# Patient Record
Sex: FEMALE | Race: OTHER | HISPANIC OR LATINO | URBAN - METROPOLITAN AREA
[De-identification: names, ages, dates, MRNs, and addresses within clinical notes are randomized per-mention and may not be internally consistent; named-entity substitution may affect disease eponyms.]

---

## 2017-11-21 ENCOUNTER — EMERGENCY (EMERGENCY)
Facility: HOSPITAL | Age: 27
LOS: 1 days | End: 2017-11-21

## 2017-11-21 VITALS
HEART RATE: 83 BPM | OXYGEN SATURATION: 99 % | SYSTOLIC BLOOD PRESSURE: 102 MMHG | RESPIRATION RATE: 16 BRPM | WEIGHT: 119.93 LBS | DIASTOLIC BLOOD PRESSURE: 68 MMHG | TEMPERATURE: 98 F

## 2017-11-21 DIAGNOSIS — Z90.49 ACQUIRED ABSENCE OF OTHER SPECIFIED PARTS OF DIGESTIVE TRACT: Chronic | ICD-10-CM

## 2022-08-05 ENCOUNTER — INPATIENT (INPATIENT)
Facility: HOSPITAL | Age: 32
LOS: 2 days | Discharge: HOME | DRG: 390 | End: 2022-08-08
Attending: SURGERY | Admitting: SURGERY
Payer: COMMERCIAL

## 2022-08-05 VITALS
OXYGEN SATURATION: 99 % | TEMPERATURE: 98 F | WEIGHT: 128.09 LBS | RESPIRATION RATE: 18 BRPM | SYSTOLIC BLOOD PRESSURE: 118 MMHG | DIASTOLIC BLOOD PRESSURE: 55 MMHG | HEIGHT: 62 IN | HEART RATE: 68 BPM

## 2022-08-05 DIAGNOSIS — Z90.49 ACQUIRED ABSENCE OF OTHER SPECIFIED PARTS OF DIGESTIVE TRACT: Chronic | ICD-10-CM

## 2022-08-05 DIAGNOSIS — K56.609 UNSPECIFIED INTESTINAL OBSTRUCTION, UNSPECIFIED AS TO PARTIAL VERSUS COMPLETE OBSTRUCTION: ICD-10-CM

## 2022-08-05 LAB
ALBUMIN SERPL ELPH-MCNC: 4.8 G/DL — SIGNIFICANT CHANGE UP (ref 3.3–5)
ALP SERPL-CCNC: 68 U/L — SIGNIFICANT CHANGE UP (ref 40–120)
ALT FLD-CCNC: 13 U/L — SIGNIFICANT CHANGE UP (ref 10–45)
ANION GAP SERPL CALC-SCNC: 14 MMOL/L — SIGNIFICANT CHANGE UP (ref 5–17)
APPEARANCE UR: CLEAR — SIGNIFICANT CHANGE UP
APTT BLD: 30 SEC — SIGNIFICANT CHANGE UP (ref 27.5–35.5)
AST SERPL-CCNC: 21 U/L — SIGNIFICANT CHANGE UP (ref 10–40)
BASOPHILS # BLD AUTO: 0.04 K/UL — SIGNIFICANT CHANGE UP (ref 0–0.2)
BASOPHILS NFR BLD AUTO: 0.4 % — SIGNIFICANT CHANGE UP (ref 0–2)
BILIRUB SERPL-MCNC: 0.6 MG/DL — SIGNIFICANT CHANGE UP (ref 0.2–1.2)
BILIRUB UR-MCNC: NEGATIVE — SIGNIFICANT CHANGE UP
BLD GP AB SCN SERPL QL: NEGATIVE — SIGNIFICANT CHANGE UP
BUN SERPL-MCNC: 12 MG/DL — SIGNIFICANT CHANGE UP (ref 7–23)
CALCIUM SERPL-MCNC: 9.9 MG/DL — SIGNIFICANT CHANGE UP (ref 8.4–10.5)
CHLORIDE SERPL-SCNC: 99 MMOL/L — SIGNIFICANT CHANGE UP (ref 96–108)
CO2 SERPL-SCNC: 24 MMOL/L — SIGNIFICANT CHANGE UP (ref 22–31)
COLOR SPEC: SIGNIFICANT CHANGE UP
CREAT SERPL-MCNC: 0.77 MG/DL — SIGNIFICANT CHANGE UP (ref 0.5–1.3)
DIFF PNL FLD: NEGATIVE — SIGNIFICANT CHANGE UP
EGFR: 104 ML/MIN/1.73M2 — SIGNIFICANT CHANGE UP
EOSINOPHIL # BLD AUTO: 0.15 K/UL — SIGNIFICANT CHANGE UP (ref 0–0.5)
EOSINOPHIL NFR BLD AUTO: 1.5 % — SIGNIFICANT CHANGE UP (ref 0–6)
GLUCOSE SERPL-MCNC: 90 MG/DL — SIGNIFICANT CHANGE UP (ref 70–99)
GLUCOSE UR QL: NEGATIVE — SIGNIFICANT CHANGE UP
HCG SERPL-ACNC: <2 MIU/ML — SIGNIFICANT CHANGE UP
HCT VFR BLD CALC: 41.5 % — SIGNIFICANT CHANGE UP (ref 34.5–45)
HGB BLD-MCNC: 13.7 G/DL — SIGNIFICANT CHANGE UP (ref 11.5–15.5)
HIV 1 & 2 AB SERPL IA.RAPID: SIGNIFICANT CHANGE UP
IMM GRANULOCYTES NFR BLD AUTO: 0.3 % — SIGNIFICANT CHANGE UP (ref 0–1.5)
INR BLD: 0.93 RATIO — SIGNIFICANT CHANGE UP (ref 0.88–1.16)
KETONES UR-MCNC: ABNORMAL
LEUKOCYTE ESTERASE UR-ACNC: NEGATIVE — SIGNIFICANT CHANGE UP
LIDOCAIN IGE QN: 20 U/L — SIGNIFICANT CHANGE UP (ref 7–60)
LYMPHOCYTES # BLD AUTO: 2.02 K/UL — SIGNIFICANT CHANGE UP (ref 1–3.3)
LYMPHOCYTES # BLD AUTO: 20.3 % — SIGNIFICANT CHANGE UP (ref 13–44)
MCHC RBC-ENTMCNC: 28.8 PG — SIGNIFICANT CHANGE UP (ref 27–34)
MCHC RBC-ENTMCNC: 33 GM/DL — SIGNIFICANT CHANGE UP (ref 32–36)
MCV RBC AUTO: 87.2 FL — SIGNIFICANT CHANGE UP (ref 80–100)
MONOCYTES # BLD AUTO: 0.62 K/UL — SIGNIFICANT CHANGE UP (ref 0–0.9)
MONOCYTES NFR BLD AUTO: 6.2 % — SIGNIFICANT CHANGE UP (ref 2–14)
NEUTROPHILS # BLD AUTO: 7.07 K/UL — SIGNIFICANT CHANGE UP (ref 1.8–7.4)
NEUTROPHILS NFR BLD AUTO: 71.3 % — SIGNIFICANT CHANGE UP (ref 43–77)
NITRITE UR-MCNC: NEGATIVE — SIGNIFICANT CHANGE UP
NRBC # BLD: 0 /100 WBCS — SIGNIFICANT CHANGE UP (ref 0–0)
PH UR: 8.5 — HIGH (ref 5–8)
PLATELET # BLD AUTO: 280 K/UL — SIGNIFICANT CHANGE UP (ref 150–400)
POTASSIUM SERPL-MCNC: 3.9 MMOL/L — SIGNIFICANT CHANGE UP (ref 3.5–5.3)
POTASSIUM SERPL-SCNC: 3.9 MMOL/L — SIGNIFICANT CHANGE UP (ref 3.5–5.3)
PROT SERPL-MCNC: 8 G/DL — SIGNIFICANT CHANGE UP (ref 6–8.3)
PROT UR-MCNC: NEGATIVE — SIGNIFICANT CHANGE UP
PROTHROM AB SERPL-ACNC: 10.8 SEC — SIGNIFICANT CHANGE UP (ref 10.5–13.4)
RBC # BLD: 4.76 M/UL — SIGNIFICANT CHANGE UP (ref 3.8–5.2)
RBC # FLD: 13.3 % — SIGNIFICANT CHANGE UP (ref 10.3–14.5)
RH IG SCN BLD-IMP: POSITIVE — SIGNIFICANT CHANGE UP
SARS-COV-2 RNA SPEC QL NAA+PROBE: SIGNIFICANT CHANGE UP
SODIUM SERPL-SCNC: 137 MMOL/L — SIGNIFICANT CHANGE UP (ref 135–145)
SP GR SPEC: 1.01 — SIGNIFICANT CHANGE UP (ref 1.01–1.02)
UROBILINOGEN FLD QL: NEGATIVE — SIGNIFICANT CHANGE UP
WBC # BLD: 9.93 K/UL — SIGNIFICANT CHANGE UP (ref 3.8–10.5)
WBC # FLD AUTO: 9.93 K/UL — SIGNIFICANT CHANGE UP (ref 3.8–10.5)

## 2022-08-05 PROCEDURE — 93975 VASCULAR STUDY: CPT | Mod: 26

## 2022-08-05 PROCEDURE — 74177 CT ABD & PELVIS W/CONTRAST: CPT | Mod: 26,MA

## 2022-08-05 PROCEDURE — 99285 EMERGENCY DEPT VISIT HI MDM: CPT

## 2022-08-05 PROCEDURE — 76830 TRANSVAGINAL US NON-OB: CPT | Mod: 26

## 2022-08-05 PROCEDURE — 71045 X-RAY EXAM CHEST 1 VIEW: CPT | Mod: 26

## 2022-08-05 RX ORDER — SODIUM CHLORIDE 9 MG/ML
1000 INJECTION, SOLUTION INTRAVENOUS
Refills: 0 | Status: DISCONTINUED | OUTPATIENT
Start: 2022-08-05 | End: 2022-08-07

## 2022-08-05 RX ORDER — MORPHINE SULFATE 50 MG/1
4 CAPSULE, EXTENDED RELEASE ORAL ONCE
Refills: 0 | Status: DISCONTINUED | OUTPATIENT
Start: 2022-08-05 | End: 2022-08-05

## 2022-08-05 RX ORDER — TETRACAINE/BENZOCAINE/BUTAMBEN 2%-14%-2%
1 OINTMENT (GRAM) TOPICAL EVERY 6 HOURS
Refills: 0 | Status: DISCONTINUED | OUTPATIENT
Start: 2022-08-05 | End: 2022-08-07

## 2022-08-05 RX ORDER — HEPARIN SODIUM 5000 [USP'U]/ML
5000 INJECTION INTRAVENOUS; SUBCUTANEOUS EVERY 8 HOURS
Refills: 0 | Status: DISCONTINUED | OUTPATIENT
Start: 2022-08-05 | End: 2022-08-08

## 2022-08-05 RX ORDER — ONDANSETRON 8 MG/1
4 TABLET, FILM COATED ORAL ONCE
Refills: 0 | Status: COMPLETED | OUTPATIENT
Start: 2022-08-05 | End: 2022-08-05

## 2022-08-05 RX ORDER — ACETAMINOPHEN 500 MG
1000 TABLET ORAL ONCE
Refills: 0 | Status: COMPLETED | OUTPATIENT
Start: 2022-08-05 | End: 2022-08-05

## 2022-08-05 RX ORDER — SODIUM CHLORIDE 9 MG/ML
1000 INJECTION INTRAMUSCULAR; INTRAVENOUS; SUBCUTANEOUS ONCE
Refills: 0 | Status: COMPLETED | OUTPATIENT
Start: 2022-08-05 | End: 2022-08-05

## 2022-08-05 RX ADMIN — Medication 1 SPRAY(S): at 13:19

## 2022-08-05 RX ADMIN — Medication 1 SPRAY(S): at 22:26

## 2022-08-05 RX ADMIN — Medication 400 MILLIGRAM(S): at 05:29

## 2022-08-05 RX ADMIN — MORPHINE SULFATE 4 MILLIGRAM(S): 50 CAPSULE, EXTENDED RELEASE ORAL at 08:04

## 2022-08-05 RX ADMIN — Medication 400 MILLIGRAM(S): at 11:31

## 2022-08-05 RX ADMIN — Medication 1000 MILLIGRAM(S): at 10:54

## 2022-08-05 RX ADMIN — HEPARIN SODIUM 5000 UNIT(S): 5000 INJECTION INTRAVENOUS; SUBCUTANEOUS at 22:21

## 2022-08-05 RX ADMIN — SODIUM CHLORIDE 50 MILLILITER(S): 9 INJECTION, SOLUTION INTRAVENOUS at 11:15

## 2022-08-05 RX ADMIN — ONDANSETRON 4 MILLIGRAM(S): 8 TABLET, FILM COATED ORAL at 05:29

## 2022-08-05 RX ADMIN — SODIUM CHLORIDE 120 MILLILITER(S): 9 INJECTION, SOLUTION INTRAVENOUS at 22:21

## 2022-08-05 RX ADMIN — HEPARIN SODIUM 5000 UNIT(S): 5000 INJECTION INTRAVENOUS; SUBCUTANEOUS at 13:16

## 2022-08-05 RX ADMIN — Medication 1000 MILLIGRAM(S): at 23:43

## 2022-08-05 RX ADMIN — MORPHINE SULFATE 4 MILLIGRAM(S): 50 CAPSULE, EXTENDED RELEASE ORAL at 09:14

## 2022-08-05 RX ADMIN — Medication 400 MILLIGRAM(S): at 23:13

## 2022-08-05 RX ADMIN — SODIUM CHLORIDE 1000 MILLILITER(S): 9 INJECTION INTRAMUSCULAR; INTRAVENOUS; SUBCUTANEOUS at 09:19

## 2022-08-05 RX ADMIN — Medication 1000 MILLIGRAM(S): at 06:54

## 2022-08-05 NOTE — ED ADULT NURSE NOTE - NSIMPLEMENTINTERV_GEN_ALL_ED
actual Implemented All Universal Safety Interventions:  [place] to call system. Call bell, personal items and telephone within reach. Instruct patient to call for assistance. Room bathroom lighting operational. Non-slip footwear when patient is off stretcher. Physically safe environment: no spills, clutter or unnecessary equipment. Stretcher in lowest position, wheels locked, appropriate side rails in place.

## 2022-08-05 NOTE — ED ADULT NURSE REASSESSMENT NOTE - NS ED NURSE REASSESS COMMENT FT1
Received a 31F aaox4 ambulatory with  PSH appendectomy presenting with abdominal pain. Pain began at 5pm yesterday following a bowel movement, feels sharp, initially located in epigastrium and migrated to RLQ. Associated with nausea, patient has been retching  but with no vomit. Denies diarrhea, dyspnea, fever or chills. RT ac 18g IV access noted, patient was sent to US. CT scan resulted with SBO noted. Pending surgical consult, kept NPO. VS WDL.

## 2022-08-05 NOTE — ED PROVIDER NOTE - ATTENDING CONTRIBUTION TO CARE
Patient is a 30 yo F with history of appendectomy here for abdominal pain that started this evening. She states she ate around 2 pm, had a bowel movement around 5:30 pm and pain started after that. She reports a burning epigastric pain but now has worse pain in her lower abdomen. Denies dysuria, diarrhea, vaginal discharge. She does have a history of ovarian cysts but is not sure where.     VS noted  Gen: uncomfortable  HEENT: EOMI, mmm  Lungs: CTAB/L no C/ W /R   CVS: RRR   Abd; Soft, ttp in lower abdomen, ttp in RLQ, no CVA tenderness bilaterally  Ext: no edema  Skin: no rash  Neuro AAOx3 non focal clear speech  a/p: abdominal pain - RLQ pain, pt is s/p appendectomy. Will do pelvic exam, get labs and TVUS. Plan for CT A/P if no findings. Differential includes colitis, ovarian torsion, ovarian cyst rupture. Patient has no pain in RUQ. Biliary pathology less likely based on exam however epigastric discomfort is concerning. Will check labs and order RUQ US if no findings.   - Keiko BOND

## 2022-08-05 NOTE — ED PROVIDER NOTE - CLINICAL SUMMARY MEDICAL DECISION MAKING FREE TEXT BOX
Patient is a 32 yo F with PSH appendectomy presenting with RLQ abdominal pain, R adnexal tenderness on exam. Given patient has had appendectomy, most concerned for R ovarian torsion vs. ruptured cyst vs. abscess vs. hemorrhagic cyst. Less likely PID with no CMT on exam. Unlikely appendicitis with history of appendectomy. If ruled out other etiologies may be attributable to constipation, IBD, though these diagnoses less likely to fit pattern of exam and pain findings. To evaluate with TVUS and/or CT, labs, then reevaluate.

## 2022-08-05 NOTE — ED PROVIDER NOTE - OBJECTIVE STATEMENT
Patient is a 30 yo F with PSH appendectomy presenting with abdominal pain. Pain began at 5pm today following a bowel movement, feels sharp, initially located in epigastrium and migrated to RLQ. Associated with nausea, patient has been retching but with no vomit. Denies diarrhea, dyspnea, fever, recent travel. Denies bloody stools. Pain not associated with meals. Espouses SOB, constipation. Has not taken any pain medicine for pain.

## 2022-08-05 NOTE — ED ADULT NURSE NOTE - OBJECTIVE STATEMENT
31 y.o. female coming in from home via private car for abdominal pain x 12 hours. pt states that she ate at 1500 and around 1700 she started having an upper abdominal pain after a BM. pt denies vomiting/diarrhea, endorses nausea since abdominal pain started. pt denies PMH. A&Ox3, vitals stable, no lower extremity edema noted, no chest pain/dizziness/SOB/weakness. bed in lowest position, mother at bedside, no other complaints at this time.

## 2022-08-05 NOTE — ED PROVIDER NOTE - PHYSICAL EXAMINATION
CONSTITUTIONAL: Well-developed; well-nourished; appears in pain  SKIN: warm, dry  HEAD: Normocephalic; atraumatic.  EYES: no conjunctival injection, no scleral icterus.   ENT: No nasal discharge; airway clear.  CARD: S1, S2 normal; no murmurs, gallops, or rubs. Regular rate and rhythm.   RESP: No wheezes, rales or rhonchi. Good air movement bilaterally.   ABD: soft, RLQ +TTP, no guarding, no distention, no rigidity.   EXT: No cyanosis or edema.   NEURO: Alert, oriented, grossly unremarkable  PSYCH: Cooperative, appropriate.   Pelvic exam: speculum exam unremarkable. +R adnexal tenderness, no L adnexal tenderness, no cervical motion tenderness

## 2022-08-05 NOTE — ED ADULT NURSE REASSESSMENT NOTE - NS ED NURSE REASSESS COMMENT FT1
Patient seen and evaluated by SUrgery resident, failed attempt to insert NGT tube. Patient admitted to Surgery service for SBO awaiting bed.

## 2022-08-05 NOTE — ED PROVIDER NOTE - PROGRESS NOTE DETAILS
Gee Mckeon MD PGY1: Patient with severe R adnexal tenderness on bimanual exam, patient very uncomfortable. Ordered CT abd/pelvis and called CT tech for urgent imaging.

## 2022-08-05 NOTE — H&P ADULT - ATTENDING COMMENTS
date of service 8/5/22    pt seen and examined  pt chart and imaging reviewed in detail  agree with note above  31F with SBO ? adhesive from previous appendectomy  admit to red team surgery  npo, ivf, ngt  strict IOs  serial abd exams  f/u labs in am  await return of GI fxn  will attempt nonoperative rx for now, if clinically fails to improve or worsens low thresh hold for rpt imaging and possible exploration  d/w pt @ bedside in detail and agrees with plan above.

## 2022-08-05 NOTE — ED PROVIDER NOTE - NS ED ROS FT
CONSTITUTIONAL - No fever, No diaphoresis  SKIN - No rash  HEMATOLOGIC - No abnormal bleeding or bruising  ENT -  No sore throat, No neck pain, no rhinorrhea  RESPIRATORY - No shortness of breath, No cough  CARDIAC -No chest pain, No palpitations  GI - as per HPI  - No dysuria, no frequency, no hematuria.   MUSCULOSKELETAL - No joint pain, No swelling, No back pain  NEUROLOGIC - No numbness, No focal weakness, No headache, No dizziness

## 2022-08-05 NOTE — H&P ADULT - HISTORY OF PRESENT ILLNESS
HPI:        PAST MEDICAL & SURGICAL HISTORY:  No pertinent past medical history      History of appendectomy          MEDICATIONS  (STANDING):  sodium chloride 0.9% Bolus 1000 milliLiter(s) IV Bolus once    MEDICATIONS  (PRN):      Allergies    No Known Allergies    Intolerances        SOCIAL HISTORY:    FAMILY HISTORY:          Physical Exam:  General: NAD, resting comfortably  HEENT: NC/AT, EOMI, normal hearing, no oral lesions, no LAD, neck supple  Pulmonary: normal resp effort, CTA-B  Cardiovascular: NSR, no murmurs  Abdominal: soft, ND/NT, no organomegaly  Extremities: WWP, normal strength, no clubbing/cyanosis/edema  Neuro: A/O x 3, CNs II-XII grossly intact, normal sensation, no focal deficits  Pulses: palpable distal pulses    Vital Signs Last 24 Hrs  T(C): 37 (05 Aug 2022 07:55), Max: 37 (05 Aug 2022 07:55)  T(F): 98.6 (05 Aug 2022 07:55), Max: 98.6 (05 Aug 2022 07:55)  HR: 75 (05 Aug 2022 07:55) (68 - 75)  BP: 106/77 (05 Aug 2022 07:55) (106/77 - 124/85)  BP(mean): --  RR: 20 (05 Aug 2022 07:55) (18 - 22)  SpO2: 96% (05 Aug 2022 07:55) (96% - 99%)    Parameters below as of 05 Aug 2022 04:25  Patient On (Oxygen Delivery Method): room air        I&O's Summary          LABS:                        13.7   9.93  )-----------( 280      ( 05 Aug 2022 05:55 )             41.5     08-    137  |  99  |  12  ----------------------------<  90  3.9   |  24  |  0.77    Ca    9.9      05 Aug 2022 05:55    TPro  8.0  /  Alb  4.8  /  TBili  0.6  /  DBili  x   /  AST  21  /  ALT  13  /  AlkPhos  68  08-05    PT/INR - ( 05 Aug 2022 05:55 )   PT: 10.8 sec;   INR: 0.93 ratio         PTT - ( 05 Aug 2022 05:55 )  PTT:30.0 sec  Urinalysis Basic - ( 05 Aug 2022 06:09 )    Color: Light Yellow / Appearance: Clear / S.012 / pH: x  Gluc: x / Ketone: Moderate  / Bili: Negative / Urobili: Negative   Blood: x / Protein: Negative / Nitrite: Negative   Leuk Esterase: Negative / RBC: x / WBC x   Sq Epi: x / Non Sq Epi: x / Bacteria: x      CAPILLARY BLOOD GLUCOSE        LIVER FUNCTIONS - ( 05 Aug 2022 05:55 )  Alb: 4.8 g/dL / Pro: 8.0 g/dL / ALK PHOS: 68 U/L / ALT: 13 U/L / AST: 21 U/L / GGT: x             Cultures:      RADIOLOGY & ADDITIONAL STUDIES:    < from: CT Abdomen and Pelvis w/ IV Cont (22 @ 06:20) >  BOWEL: Small bowel obstruction with transition at the level of a   thickened appearing loop of bowel within the central pelvis (602:41-43).   Upstream small bowel demonstrates small bowel feces sign. Appendectomy.  PERITONEUM: Small pelvic free fluid.  VESSELS: Within normal limits.  RETROPERITONEUM/LYMPH NODES: No lymphadenopathy.  ABDOMINAL WALL: Within normallimits.  BONES: Within normal limits.    IMPRESSION:  Small bowel obstruction with transition in the mid pelvis just proximal   to a small bowel loop demonstrating wall thickening. Upstream small bowel   feces sign.    Small pelvic ascites.      < end of copied text >    < from: US Transvaginal (22 @ 07:20) >    IMPRESSION:  Unremarkable sonographic evaluation of the ovaries.    Moderate pelvic free fluid.    < end of copied text >               HPI:  31F no PMH, PSH laparoscopic appendectomy 15 years ago, p/w abdominal pain, nausea. Last BM and flatus 5:30PM yesterday, pain increasing since. Has not vomited yet, but continues to have nausea. Endorses bloating and feeling need to move bowels without success. Denies fevers, chills, SOB. Had been having regular BMs 1-2x a day until most recent episode.     In ED, patient hemodynamically stable, labs WNL, TVUS WNL.     PAST MEDICAL & SURGICAL HISTORY:  No pertinent past medical history      History of appendectomy          MEDICATIONS  (STANDING):  sodium chloride 0.9% Bolus 1000 milliLiter(s) IV Bolus once    MEDICATIONS  (PRN):      Allergies    No Known Allergies    Intolerances          Physical Exam:  General: NAD, resting comfortably  HEENT: NC/AT, EOMI, normal hearing, no oral lesions, no LAD, neck supple  Pulmonary: normal resp effort, CTA-B  Cardiovascular: NSR, no murmurs  Abdominal: soft, bloated, tender to palpation in all 4 quadrants, worse in epigastrium   Extremities: WWP, normal strength, no clubbing/cyanosis/edema  Neuro: A/O x 3, CNs II-XII grossly intact, normal sensation, no focal deficits  Pulses: palpable distal pulses    Vital Signs Last 24 Hrs  T(C): 37 (05 Aug 2022 07:55), Max: 37 (05 Aug 2022 07:55)  T(F): 98.6 (05 Aug 2022 07:55), Max: 98.6 (05 Aug 2022 07:55)  HR: 75 (05 Aug 2022 07:55) (68 - 75)  BP: 106/77 (05 Aug 2022 07:55) (106/77 - 124/85)  BP(mean): --  RR: 20 (05 Aug 2022 07:55) (18 - 22)  SpO2: 96% (05 Aug 2022 07:55) (96% - 99%)    Parameters below as of 05 Aug 2022 04:25  Patient On (Oxygen Delivery Method): room air        I&O's Summary          LABS:                        13.7   9.93  )-----------( 280      ( 05 Aug 2022 05:55 )             41.5     08-05    137  |  99  |  12  ----------------------------<  90  3.9   |  24  |  0.77    Ca    9.9      05 Aug 2022 05:55    TPro  8.0  /  Alb  4.8  /  TBili  0.6  /  DBili  x   /  AST  21  /  ALT  13  /  AlkPhos  68  08-05    PT/INR - ( 05 Aug 2022 05:55 )   PT: 10.8 sec;   INR: 0.93 ratio         PTT - ( 05 Aug 2022 05:55 )  PTT:30.0 sec  Urinalysis Basic - ( 05 Aug 2022 06:09 )    Color: Light Yellow / Appearance: Clear / S.012 / pH: x  Gluc: x / Ketone: Moderate  / Bili: Negative / Urobili: Negative   Blood: x / Protein: Negative / Nitrite: Negative   Leuk Esterase: Negative / RBC: x / WBC x   Sq Epi: x / Non Sq Epi: x / Bacteria: x      CAPILLARY BLOOD GLUCOSE        LIVER FUNCTIONS - ( 05 Aug 2022 05:55 )  Alb: 4.8 g/dL / Pro: 8.0 g/dL / ALK PHOS: 68 U/L / ALT: 13 U/L / AST: 21 U/L / GGT: x             Cultures:      RADIOLOGY & ADDITIONAL STUDIES:    < from: CT Abdomen and Pelvis w/ IV Cont (22 @ 06:20) >  BOWEL: Small bowel obstruction with transition at the level of a   thickened appearing loop of bowel within the central pelvis (602:41-43).   Upstream small bowel demonstrates small bowel feces sign. Appendectomy.  PERITONEUM: Small pelvic free fluid.  VESSELS: Within normal limits.  RETROPERITONEUM/LYMPH NODES: No lymphadenopathy.  ABDOMINAL WALL: Within normallimits.  BONES: Within normal limits.    IMPRESSION:  Small bowel obstruction with transition in the mid pelvis just proximal   to a small bowel loop demonstrating wall thickening. Upstream small bowel   feces sign.    Small pelvic ascites.      < end of copied text >    < from: US Transvaginal (22 @ 07:20) >    IMPRESSION:  Unremarkable sonographic evaluation of the ovaries.    Moderate pelvic free fluid.    < end of copied text >

## 2022-08-05 NOTE — H&P ADULT - ASSESSMENT
31F no PMH, PSH laparoscopic appendectomy 15 years ago, p/w abdominal pain, nausea, found to have SBO with TP in mid pelvis.     - Admit to Surgery, Dr. Kevin Malave  - NGT/NPO/IVF  - ATC acetaminophen, exam before pain meds   - Monitor bowel function    Discussed with attending surgeon Dr. Kevin Peterson, PGY-2  Red Surgery  [id number]  31F no PMH, PSH laparoscopic appendectomy 15 years ago, p/w abdominal pain, nausea, found to have SBO with TP in mid pelvis.     - Admit to Surgery, Dr. Kevin Malave  - NGT/NPO/IVF  - IV acetaminophen, exam before pain meds   - Monitor bowel function, NGT output    Discussed with attending surgeon Dr. Kevin Peterson, PGY-2  Red Surgery  [id number]

## 2022-08-06 LAB
ANION GAP SERPL CALC-SCNC: 14 MMOL/L — SIGNIFICANT CHANGE UP (ref 5–17)
BUN SERPL-MCNC: 8 MG/DL — SIGNIFICANT CHANGE UP (ref 7–23)
CALCIUM SERPL-MCNC: 8.6 MG/DL — SIGNIFICANT CHANGE UP (ref 8.4–10.5)
CHLORIDE SERPL-SCNC: 102 MMOL/L — SIGNIFICANT CHANGE UP (ref 96–108)
CO2 SERPL-SCNC: 21 MMOL/L — LOW (ref 22–31)
CREAT SERPL-MCNC: 0.65 MG/DL — SIGNIFICANT CHANGE UP (ref 0.5–1.3)
CULTURE RESULTS: SIGNIFICANT CHANGE UP
EGFR: 119 ML/MIN/1.73M2 — SIGNIFICANT CHANGE UP
GLUCOSE SERPL-MCNC: 80 MG/DL — SIGNIFICANT CHANGE UP (ref 70–99)
HCT VFR BLD CALC: 35.8 % — SIGNIFICANT CHANGE UP (ref 34.5–45)
HGB BLD-MCNC: 11.6 G/DL — SIGNIFICANT CHANGE UP (ref 11.5–15.5)
LACTATE SERPL-SCNC: 0.7 MMOL/L — SIGNIFICANT CHANGE UP (ref 0.7–2)
MAGNESIUM SERPL-MCNC: 2 MG/DL — SIGNIFICANT CHANGE UP (ref 1.6–2.6)
MCHC RBC-ENTMCNC: 28.6 PG — SIGNIFICANT CHANGE UP (ref 27–34)
MCHC RBC-ENTMCNC: 32.4 GM/DL — SIGNIFICANT CHANGE UP (ref 32–36)
MCV RBC AUTO: 88.4 FL — SIGNIFICANT CHANGE UP (ref 80–100)
NRBC # BLD: 0 /100 WBCS — SIGNIFICANT CHANGE UP (ref 0–0)
PHOSPHATE SERPL-MCNC: 3.3 MG/DL — SIGNIFICANT CHANGE UP (ref 2.5–4.5)
PLATELET # BLD AUTO: 243 K/UL — SIGNIFICANT CHANGE UP (ref 150–400)
POTASSIUM SERPL-MCNC: 3.6 MMOL/L — SIGNIFICANT CHANGE UP (ref 3.5–5.3)
POTASSIUM SERPL-SCNC: 3.6 MMOL/L — SIGNIFICANT CHANGE UP (ref 3.5–5.3)
RBC # BLD: 4.05 M/UL — SIGNIFICANT CHANGE UP (ref 3.8–5.2)
RBC # FLD: 13.4 % — SIGNIFICANT CHANGE UP (ref 10.3–14.5)
SODIUM SERPL-SCNC: 137 MMOL/L — SIGNIFICANT CHANGE UP (ref 135–145)
SPECIMEN SOURCE: SIGNIFICANT CHANGE UP
WBC # BLD: 6.81 K/UL — SIGNIFICANT CHANGE UP (ref 3.8–10.5)
WBC # FLD AUTO: 6.81 K/UL — SIGNIFICANT CHANGE UP (ref 3.8–10.5)

## 2022-08-06 PROCEDURE — 93010 ELECTROCARDIOGRAM REPORT: CPT

## 2022-08-06 RX ORDER — BENZOCAINE AND MENTHOL 5; 1 G/100ML; G/100ML
1 LIQUID ORAL ONCE
Refills: 0 | Status: COMPLETED | OUTPATIENT
Start: 2022-08-06 | End: 2022-08-06

## 2022-08-06 RX ADMIN — HEPARIN SODIUM 5000 UNIT(S): 5000 INJECTION INTRAVENOUS; SUBCUTANEOUS at 05:24

## 2022-08-06 RX ADMIN — HEPARIN SODIUM 5000 UNIT(S): 5000 INJECTION INTRAVENOUS; SUBCUTANEOUS at 22:58

## 2022-08-06 RX ADMIN — SODIUM CHLORIDE 120 MILLILITER(S): 9 INJECTION, SOLUTION INTRAVENOUS at 22:58

## 2022-08-06 RX ADMIN — HEPARIN SODIUM 5000 UNIT(S): 5000 INJECTION INTRAVENOUS; SUBCUTANEOUS at 18:13

## 2022-08-06 RX ADMIN — BENZOCAINE AND MENTHOL 1 LOZENGE: 5; 1 LIQUID ORAL at 18:14

## 2022-08-06 NOTE — PROVIDER CONTACT NOTE (OTHER) - REASON
EKG results read normal sinus rhythm and nonspecific T wave abnormality.
Patient complain of nonradiating chest pain 5/10
NG tube placement.
Patient complain of right face discomfort and throat hurting from Ng tube.

## 2022-08-06 NOTE — PROVIDER CONTACT NOTE (OTHER) - ACTION/TREATMENT ORDERED:
MD recommend to leave patient on LWS to NG tube.
No action at this point.
MD assessed at bedside. MD to order EKG.
MD to assess at bedside.

## 2022-08-06 NOTE — PROVIDER CONTACT NOTE (OTHER) - ASSESSMENT
Per day nurse, GENARO Lara, patient complain of pain from NG tube and state that Ng tube is leaking. GENARO Lara state that team was contacted. Patient state that she no longer wants NG tube in place.
Patient complain of right face discomfort and throat hurting from Ng tube. Patient specifically states that right side of mouth hurts (teeth and gums) w/ right eye tearing from NG tube.
EKG results read normal sinus rhythm and nonspecific T wave abnormality.
Patient complain of nonradiating chest pain 5/10 on left side of chest, which she believes is relating to NG tube. Patient denies shortness of breath and denies being in distress.

## 2022-08-06 NOTE — PROGRESS NOTE ADULT - SUBJECTIVE AND OBJECTIVE BOX
Red team Surgery Progress Note    INTERVAL EVENTS:   No acute events overnight. 1 dose of IV Tylenol given for right face pain, headache and through pain. Pain improved after medication. Patient complaining about NGT, makes her uncomfortable; the benefits of NGT, specially overnight and the possible consequences of discontinuing the NGT at night were explained, patient accepted staying with NGT.     SUBJECTIVE: Patient seen and examined at bedside with surgical team,     OBJECTIVE:    Vital Signs Last 24 Hrs  T(C): 37.8 (06 Aug 2022 00:16), Max: 37.8 (06 Aug 2022 00:16)  T(F): 100.1 (06 Aug 2022 00:16), Max: 100.1 (06 Aug 2022 00:16)  HR: 79 (06 Aug 2022 00:16) (66 - 81)  BP: 126/76 (06 Aug 2022 00:16) (106/77 - 126/76)  BP(mean): --  RR: 18 (06 Aug 2022 00:16) (17 - 22)  SpO2: 99% (06 Aug 2022 00:16) (95% - 99%)    Parameters below as of 06 Aug 2022 00:16  Patient On (Oxygen Delivery Method): room air    I&O's Detail    05 Aug 2022 07:01  -  06 Aug 2022 01:23  --------------------------------------------------------  IN:  Total IN: 0 mL    OUT:    Blood Loss (mL): 0 mL    Oral Fluid: 0 mL    Voided (mL): 0 mL  Total OUT: 0 mL    Total NET: 0 mL      MEDICATIONS  (STANDING):  heparin   Injectable 5000 Unit(s) SubCutaneous every 8 hours  lactated ringers. 1000 milliLiter(s) (120 mL/Hr) IV Continuous <Continuous>    MEDICATIONS  (PRN):  tetracaine/benzocaine/butamben Spray 1 Spray(s) Topical every 6 hours PRN ngt      PHYSICAL EXAM:  Constitutional: A&Ox3, NAD  Respiratory: Unlabored breathing  Abdomen:?????  Extremities: WWP, BETANCOURT spontaneously    LABS:                        13.7   9.93  )-----------( 280      ( 05 Aug 2022 05:55 )             41.5         137  |  99  |  12  ----------------------------<  90  3.9   |  24  |  0.77    Ca    9.9      05 Aug 2022 05:55    TPro  8.0  /  Alb  4.8  /  TBili  0.6  /  DBili  x   /  AST  21  /  ALT  13  /  AlkPhos  68      PT/INR - ( 05 Aug 2022 05:55 )   PT: 10.8 sec;   INR: 0.93 ratio         PTT - ( 05 Aug 2022 05:55 )  PTT:30.0 sec  LIVER FUNCTIONS - ( 05 Aug 2022 05:55 )  Alb: 4.8 g/dL / Pro: 8.0 g/dL / ALK PHOS: 68 U/L / ALT: 13 U/L / AST: 21 U/L / GGT: x           Urinalysis Basic - ( 05 Aug 2022 06:09 )    Color: Light Yellow / Appearance: Clear / S.012 / pH: x  Gluc: x / Ketone: Moderate  / Bili: Negative / Urobili: Negative   Blood: x / Protein: Negative / Nitrite: Negative   Leuk Esterase: Negative / RBC: x / WBC x   Sq Epi: x / Non Sq Epi: x / Bacteria: x      ABO Interpretation: O (22 @ 06:02)  ABO Interpretation: O (22 @ 05:58)      IMAGING:     Red team Surgery Progress Note    INTERVAL EVENTS:   No acute events overnight. 1 dose of IV Tylenol given for right face pain, headache and through pain. Pain improved after medication. Patient complaining about NGT, makes her uncomfortable; the benefits of NGT, specially overnight and the possible consequences of discontinuing the NGT at night were explained, patient accepted staying with NGT. Patient was complaining of chest discomfort, said it was due to tube, EKG obtained which was normal.     SUBJECTIVE: Patient seen and examined at bedside with surgical team, said that she had one episode of bowel movement and passing gas.     OBJECTIVE:    Vital Signs Last 24 Hrs  T(C): 37.8 (06 Aug 2022 00:16), Max: 37.8 (06 Aug 2022 00:16)  T(F): 100.1 (06 Aug 2022 00:16), Max: 100.1 (06 Aug 2022 00:16)  HR: 79 (06 Aug 2022 00:16) (66 - 81)  BP: 126/76 (06 Aug 2022 00:16) (106/77 - 126/76)  BP(mean): --  RR: 18 (06 Aug 2022 00:16) (17 - 22)  SpO2: 99% (06 Aug 2022 00:16) (95% - 99%)    Parameters below as of 06 Aug 2022 00:16  Patient On (Oxygen Delivery Method): room air    I&O's Detail    05 Aug 2022 07:01  -  06 Aug 2022 01:23  --------------------------------------------------------  IN:  Total IN: 0 mL    OUT:    Blood Loss (mL): 0 mL    Oral Fluid: 0 mL    Voided (mL): 0 mL  Total OUT: 0 mL    Total NET: 0 mL      MEDICATIONS  (STANDING):  heparin   Injectable 5000 Unit(s) SubCutaneous every 8 hours  lactated ringers. 1000 milliLiter(s) (120 mL/Hr) IV Continuous <Continuous>    MEDICATIONS  (PRN):  tetracaine/benzocaine/butamben Spray 1 Spray(s) Topical every 6 hours PRN ngt      PHYSICAL EXAM:  Constitutional: A&Ox3, NAD  Respiratory: Unlabored breathing  Abdomen: nondistended, some TTP in RLQ  Extremities: WWP, BETANCOURT spontaneously    LABS:                        13.7   9.93  )-----------( 280      ( 05 Aug 2022 05:55 )             41.5         137  |  99  |  12  ----------------------------<  90  3.9   |  24  |  0.77    Ca    9.9      05 Aug 2022 05:55    TPro  8.0  /  Alb  4.8  /  TBili  0.6  /  DBili  x   /  AST  21  /  ALT  13  /  AlkPhos  68      PT/INR - ( 05 Aug 2022 05:55 )   PT: 10.8 sec;   INR: 0.93 ratio         PTT - ( 05 Aug 2022 05:55 )  PTT:30.0 sec  LIVER FUNCTIONS - ( 05 Aug 2022 05:55 )  Alb: 4.8 g/dL / Pro: 8.0 g/dL / ALK PHOS: 68 U/L / ALT: 13 U/L / AST: 21 U/L / GGT: x           Urinalysis Basic - ( 05 Aug 2022 06:09 )    Color: Light Yellow / Appearance: Clear / S.012 / pH: x  Gluc: x / Ketone: Moderate  / Bili: Negative / Urobili: Negative   Blood: x / Protein: Negative / Nitrite: Negative   Leuk Esterase: Negative / RBC: x / WBC x   Sq Epi: x / Non Sq Epi: x / Bacteria: x      ABO Interpretation: O (22 @ 06:02)  ABO Interpretation: O (22 @ 05:58)      IMAGING:

## 2022-08-06 NOTE — PROVIDER CONTACT NOTE (OTHER) - BACKGROUND
Patient admitted for SBO
Patient admitted for SBO.

## 2022-08-06 NOTE — PROGRESS NOTE ADULT - ASSESSMENT
31F no PMH, PSH laparoscopic appendectomy 15 years ago, p/w abdominal pain, nausea, found to have SBO with TP in mid pelvis.     PLAN:  - NGT/NPO/IVF  - IV acetaminophen, exam before pain meds   - Monitor bowel function, NGT output      Red Surgery  [id number]  31F no PMH, PSH laparoscopic appendectomy 15 years ago, p/w abdominal pain, nausea, found to have SBO with TP in mid pelvis.     PLAN:  - NGT/NPO/IVF  - IV acetaminophen, exam before pain meds   - Monitor bowel function, NGT output  - If output removes low & improving GI function, may attempt clamp trial later today    Red Surgery  [id number]

## 2022-08-06 NOTE — PROVIDER CONTACT NOTE (OTHER) - RECOMMENDATIONS
MD chavira.
MD chavira.
MD assessed at bedside and confirmed placement of NG tube. MD recommend to leave patient on LWS to NG tube.
MD chavira.

## 2022-08-06 NOTE — PROVIDER CONTACT NOTE (OTHER) - SITUATION
EKG results read normal sinus rhythm and nonspecific T wave abnormality.
Patient complain of nonradiating chest pain 5/10
Patient complain of right face discomfort and throat hurting from Ng tube.

## 2022-08-07 LAB
ANION GAP SERPL CALC-SCNC: 15 MMOL/L — SIGNIFICANT CHANGE UP (ref 5–17)
BUN SERPL-MCNC: 7 MG/DL — SIGNIFICANT CHANGE UP (ref 7–23)
CALCIUM SERPL-MCNC: 8.6 MG/DL — SIGNIFICANT CHANGE UP (ref 8.4–10.5)
CHLORIDE SERPL-SCNC: 101 MMOL/L — SIGNIFICANT CHANGE UP (ref 96–108)
CO2 SERPL-SCNC: 20 MMOL/L — LOW (ref 22–31)
CREAT SERPL-MCNC: 0.61 MG/DL — SIGNIFICANT CHANGE UP (ref 0.5–1.3)
EGFR: 121 ML/MIN/1.73M2 — SIGNIFICANT CHANGE UP
GLUCOSE SERPL-MCNC: 63 MG/DL — LOW (ref 70–99)
HCT VFR BLD CALC: 34 % — LOW (ref 34.5–45)
HGB BLD-MCNC: 11 G/DL — LOW (ref 11.5–15.5)
MAGNESIUM SERPL-MCNC: 1.7 MG/DL — SIGNIFICANT CHANGE UP (ref 1.6–2.6)
MCHC RBC-ENTMCNC: 28.5 PG — SIGNIFICANT CHANGE UP (ref 27–34)
MCHC RBC-ENTMCNC: 32.4 GM/DL — SIGNIFICANT CHANGE UP (ref 32–36)
MCV RBC AUTO: 88.1 FL — SIGNIFICANT CHANGE UP (ref 80–100)
NRBC # BLD: 0 /100 WBCS — SIGNIFICANT CHANGE UP (ref 0–0)
PHOSPHATE SERPL-MCNC: 2.9 MG/DL — SIGNIFICANT CHANGE UP (ref 2.5–4.5)
PLATELET # BLD AUTO: 225 K/UL — SIGNIFICANT CHANGE UP (ref 150–400)
POTASSIUM SERPL-MCNC: 3.7 MMOL/L — SIGNIFICANT CHANGE UP (ref 3.5–5.3)
POTASSIUM SERPL-SCNC: 3.7 MMOL/L — SIGNIFICANT CHANGE UP (ref 3.5–5.3)
RBC # BLD: 3.86 M/UL — SIGNIFICANT CHANGE UP (ref 3.8–5.2)
RBC # FLD: 13.1 % — SIGNIFICANT CHANGE UP (ref 10.3–14.5)
SODIUM SERPL-SCNC: 136 MMOL/L — SIGNIFICANT CHANGE UP (ref 135–145)
WBC # BLD: 7.16 K/UL — SIGNIFICANT CHANGE UP (ref 3.8–10.5)
WBC # FLD AUTO: 7.16 K/UL — SIGNIFICANT CHANGE UP (ref 3.8–10.5)

## 2022-08-07 RX ORDER — MAGNESIUM SULFATE 500 MG/ML
2 VIAL (ML) INJECTION ONCE
Refills: 0 | Status: COMPLETED | OUTPATIENT
Start: 2022-08-07 | End: 2022-08-07

## 2022-08-07 RX ADMIN — HEPARIN SODIUM 5000 UNIT(S): 5000 INJECTION INTRAVENOUS; SUBCUTANEOUS at 13:47

## 2022-08-07 RX ADMIN — HEPARIN SODIUM 5000 UNIT(S): 5000 INJECTION INTRAVENOUS; SUBCUTANEOUS at 04:56

## 2022-08-07 RX ADMIN — Medication 25 GRAM(S): at 13:55

## 2022-08-07 RX ADMIN — SODIUM CHLORIDE 120 MILLILITER(S): 9 INJECTION, SOLUTION INTRAVENOUS at 13:55

## 2022-08-07 NOTE — PROGRESS NOTE ADULT - SUBJECTIVE AND OBJECTIVE BOX
OBJECTIVE  T(C): 36.3 (22 @ 01:09), Max: 37.2 (22 @ 04:12)  HR: 72 (22 @ 01:09) (69 - 87)  BP: 117/71 (22 @ 01:09) (117/71 - 138/80)  RR: 18 (22 @ 01:09) (17 - 18)  SpO2: 98% (22 @ 01:09) (97% - 100%)  I&O's Summary    05 Aug 2022 07:01  -  06 Aug 2022 07:00  --------------------------------------------------------  IN: 100 mL / OUT: 100 mL / NET: 0 mL    06 Aug 2022 07:01  -  07 Aug 2022 03:09  --------------------------------------------------------  IN: 0 mL / OUT: 0 mL / NET: 0 mL      I&O's Detail    05 Aug 2022 07:01  -  06 Aug 2022 07:00  --------------------------------------------------------  IN:    IV PiggyBack: 100 mL  Total IN: 100 mL    OUT:    Blood Loss (mL): 0 mL    Nasogastric/Oral tube (mL): 100 mL    Oral Fluid: 0 mL    Voided (mL): 0 mL  Total OUT: 100 mL    Total NET: 0 mL      06 Aug 2022 07:01  -  07 Aug 2022 03:09  --------------------------------------------------------  IN:  Total IN: 0 mL    OUT:    Nasogastric/Oral tube (mL): 0 mL    Oral Fluid: 0 mL  Total OUT: 0 mL    Total NET: 0 mL        LABS                        11.6   6.81  )-----------( 243      ( 06 Aug 2022 07:20 )             35.8     08-06    137  |  102  |  8   ----------------------------<  80  3.6   |  21<L>  |  0.65    Ca    8.6      06 Aug 2022 07:19  Phos  3.3     08-06  Mg     2.0     08-06    TPro  8.0  /  Alb  4.8  /  TBili  0.6  /  DBili  x   /  AST  21  /  ALT  13  /  AlkPhos  68  08-05    PT/INR - ( 05 Aug 2022 05:55 )   PT: 10.8 sec;   INR: 0.93 ratio         PTT - ( 05 Aug 2022 05:55 )  PTT:30.0 sec  Urinalysis Basic - ( 05 Aug 2022 06:09 )    Color: Light Yellow / Appearance: Clear / S.012 / pH: x  Gluc: x / Ketone: Moderate  / Bili: Negative / Urobili: Negative   Blood: x / Protein: Negative / Nitrite: Negative   Leuk Esterase: Negative / RBC: x / WBC x   Sq Epi: x / Non Sq Epi: x / Bacteria: x      ORDERS/MEDICATIONS  MEDICATIONS  (STANDING):  heparin   Injectable 5000 Unit(s) SubCutaneous every 8 hours  lactated ringers. 1000 milliLiter(s) (120 mL/Hr) IV Continuous <Continuous>    MEDICATIONS  (PRN):  tetracaine/benzocaine/butamben Spray 1 Spray(s) Topical every 6 hours PRN ngt    Red team Surgery Progress Note    INTERVAL EVENTS:   Pt successfully passed clamp trial with <50ccs drained after 4hrs clamped. NGT removed. No other acute events.     SUBJECTIVE: Patient seen and examined at bedside with surgical team, said that she had one episode of bowel movement and passing gas.     OBJECTIVE:  PHYSICAL EXAM:  Constitutional: A&Ox3, NAD  Respiratory: Unlabored breathing  Abdomen: nondistended, some TTP in RLQ  Extremities: WWP, BETANCOURT spontaneously

## 2022-08-07 NOTE — PROGRESS NOTE ADULT - ASSESSMENT
31F no PMH, PSH laparoscopic appendectomy 15 years ago, p/w abdominal pain, nausea, found to have SBO with TP in mid pelvis.     PLAN:  - NGT out  - NPO w sips/chips  - IV acetaminophen, exam before pain meds   - Monitor bowel function    [facility]  [id number]  31F no PMH, PSH laparoscopic appendectomy 15 years ago, p/w abdominal pain, nausea, found to have SBO with TP in mid pelvis.     PLAN:  - NGT out  - Advance diet to CLD today  - IV acetaminophen, exam before pain meds   - Monitor GI function    [facility]  [id number]

## 2022-08-08 VITALS
DIASTOLIC BLOOD PRESSURE: 66 MMHG | HEART RATE: 67 BPM | SYSTOLIC BLOOD PRESSURE: 104 MMHG | TEMPERATURE: 98 F | RESPIRATION RATE: 18 BRPM | OXYGEN SATURATION: 97 %

## 2022-08-08 LAB
ANION GAP SERPL CALC-SCNC: 9 MMOL/L — SIGNIFICANT CHANGE UP (ref 5–17)
BUN SERPL-MCNC: 8 MG/DL — SIGNIFICANT CHANGE UP (ref 7–23)
CALCIUM SERPL-MCNC: 9.1 MG/DL — SIGNIFICANT CHANGE UP (ref 8.4–10.5)
CHLORIDE SERPL-SCNC: 102 MMOL/L — SIGNIFICANT CHANGE UP (ref 96–108)
CO2 SERPL-SCNC: 26 MMOL/L — SIGNIFICANT CHANGE UP (ref 22–31)
CREAT SERPL-MCNC: 0.63 MG/DL — SIGNIFICANT CHANGE UP (ref 0.5–1.3)
EGFR: 120 ML/MIN/1.73M2 — SIGNIFICANT CHANGE UP
GLUCOSE SERPL-MCNC: 101 MG/DL — HIGH (ref 70–99)
HCT VFR BLD CALC: 36.5 % — SIGNIFICANT CHANGE UP (ref 34.5–45)
HGB BLD-MCNC: 11.7 G/DL — SIGNIFICANT CHANGE UP (ref 11.5–15.5)
MAGNESIUM SERPL-MCNC: 2 MG/DL — SIGNIFICANT CHANGE UP (ref 1.6–2.6)
MCHC RBC-ENTMCNC: 28.7 PG — SIGNIFICANT CHANGE UP (ref 27–34)
MCHC RBC-ENTMCNC: 32.1 GM/DL — SIGNIFICANT CHANGE UP (ref 32–36)
MCV RBC AUTO: 89.5 FL — SIGNIFICANT CHANGE UP (ref 80–100)
NRBC # BLD: 0 /100 WBCS — SIGNIFICANT CHANGE UP (ref 0–0)
PHOSPHATE SERPL-MCNC: 3.7 MG/DL — SIGNIFICANT CHANGE UP (ref 2.5–4.5)
PLATELET # BLD AUTO: 232 K/UL — SIGNIFICANT CHANGE UP (ref 150–400)
POTASSIUM SERPL-MCNC: 3.9 MMOL/L — SIGNIFICANT CHANGE UP (ref 3.5–5.3)
POTASSIUM SERPL-SCNC: 3.9 MMOL/L — SIGNIFICANT CHANGE UP (ref 3.5–5.3)
RBC # BLD: 4.08 M/UL — SIGNIFICANT CHANGE UP (ref 3.8–5.2)
RBC # FLD: 12.9 % — SIGNIFICANT CHANGE UP (ref 10.3–14.5)
SODIUM SERPL-SCNC: 137 MMOL/L — SIGNIFICANT CHANGE UP (ref 135–145)
WBC # BLD: 5.02 K/UL — SIGNIFICANT CHANGE UP (ref 3.8–10.5)
WBC # FLD AUTO: 5.02 K/UL — SIGNIFICANT CHANGE UP (ref 3.8–10.5)

## 2022-08-08 PROCEDURE — 76830 TRANSVAGINAL US NON-OB: CPT

## 2022-08-08 PROCEDURE — 96375 TX/PRO/DX INJ NEW DRUG ADDON: CPT

## 2022-08-08 PROCEDURE — U0003: CPT

## 2022-08-08 PROCEDURE — 93975 VASCULAR STUDY: CPT

## 2022-08-08 PROCEDURE — 36415 COLL VENOUS BLD VENIPUNCTURE: CPT

## 2022-08-08 PROCEDURE — 85730 THROMBOPLASTIN TIME PARTIAL: CPT

## 2022-08-08 PROCEDURE — 86900 BLOOD TYPING SEROLOGIC ABO: CPT

## 2022-08-08 PROCEDURE — 85027 COMPLETE CBC AUTOMATED: CPT

## 2022-08-08 PROCEDURE — 81003 URINALYSIS AUTO W/O SCOPE: CPT

## 2022-08-08 PROCEDURE — 86850 RBC ANTIBODY SCREEN: CPT

## 2022-08-08 PROCEDURE — 85610 PROTHROMBIN TIME: CPT

## 2022-08-08 PROCEDURE — 86901 BLOOD TYPING SEROLOGIC RH(D): CPT

## 2022-08-08 PROCEDURE — 80053 COMPREHEN METABOLIC PANEL: CPT

## 2022-08-08 PROCEDURE — 80048 BASIC METABOLIC PNL TOTAL CA: CPT

## 2022-08-08 PROCEDURE — 86703 HIV-1/HIV-2 1 RESULT ANTBDY: CPT

## 2022-08-08 PROCEDURE — 96365 THER/PROPH/DIAG IV INF INIT: CPT | Mod: XU

## 2022-08-08 PROCEDURE — 71045 X-RAY EXAM CHEST 1 VIEW: CPT

## 2022-08-08 PROCEDURE — 83690 ASSAY OF LIPASE: CPT

## 2022-08-08 PROCEDURE — 83735 ASSAY OF MAGNESIUM: CPT

## 2022-08-08 PROCEDURE — 83605 ASSAY OF LACTIC ACID: CPT

## 2022-08-08 PROCEDURE — 93005 ELECTROCARDIOGRAM TRACING: CPT

## 2022-08-08 PROCEDURE — U0005: CPT

## 2022-08-08 PROCEDURE — 99285 EMERGENCY DEPT VISIT HI MDM: CPT | Mod: 25

## 2022-08-08 PROCEDURE — 84702 CHORIONIC GONADOTROPIN TEST: CPT

## 2022-08-08 PROCEDURE — 85025 COMPLETE CBC W/AUTO DIFF WBC: CPT

## 2022-08-08 PROCEDURE — 87086 URINE CULTURE/COLONY COUNT: CPT

## 2022-08-08 PROCEDURE — 84100 ASSAY OF PHOSPHORUS: CPT

## 2022-08-08 PROCEDURE — 74177 CT ABD & PELVIS W/CONTRAST: CPT | Mod: MA

## 2022-08-08 RX ORDER — POTASSIUM CHLORIDE 20 MEQ
10 PACKET (EA) ORAL DAILY
Refills: 0 | Status: DISCONTINUED | OUTPATIENT
Start: 2022-08-08 | End: 2022-08-08

## 2022-08-08 NOTE — PROGRESS NOTE ADULT - SUBJECTIVE AND OBJECTIVE BOX
Red team Surgery Progress Note    INTERVAL EVENTS:   No acute events overnight.    SUBJECTIVE: Patient seen and examined at bedside with surgical team,     OBJECTIVE:    Vital Signs Last 24 Hrs  T(C): 36.2 (08 Aug 2022 01:23), Max: 37.3 (07 Aug 2022 08:50)  T(F): 97.2 (08 Aug 2022 01:23), Max: 99.1 (07 Aug 2022 08:50)  HR: 71 (08 Aug 2022 01:23) (71 - 87)  BP: 112/64 (08 Aug 2022 01:23) (105/66 - 114/78)  BP(mean): --  RR: 18 (08 Aug 2022 01:23) (18 - 18)  SpO2: 99% (08 Aug 2022 01:23) (98% - 99%)    Parameters below as of 08 Aug 2022 01:23  Patient On (Oxygen Delivery Method): room air    I&O's Detail    06 Aug 2022 07:01  -  07 Aug 2022 07:00  --------------------------------------------------------  IN:    Lactated Ringers: 1440 mL  Total IN: 1440 mL    OUT:    Nasogastric/Oral tube (mL): 0 mL    Oral Fluid: 0 mL  Total OUT: 0 mL    Total NET: 1440 mL      07 Aug 2022 07:01  -  08 Aug 2022 03:05  --------------------------------------------------------  IN:    Oral Fluid: 1060 mL  Total IN: 1060 mL    OUT:  Total OUT: 0 mL    Total NET: 1060 mL      MEDICATIONS  (STANDING):  heparin   Injectable 5000 Unit(s) SubCutaneous every 8 hours    MEDICATIONS  (PRN):      PHYSICAL EXAM:  Constitutional: A&Ox3, NAD  Respiratory: Unlabored breathing  Abdomen: ??????  Extremities: WWP, BETANCOURT spontaneously    LABS:                        11.0   7.16  )-----------( 225      ( 07 Aug 2022 07:30 )             34.0     08-07    136  |  101  |  7   ----------------------------<  63<L>  3.7   |  20<L>  |  0.61    Ca    8.6      07 Aug 2022 07:32  Phos  2.9     08-07  Mg     1.7     08-07                IMAGING:     Red team Surgery Progress Note    INTERVAL EVENTS:   No acute events overnight.    SUBJECTIVE: Patient seen and examined at bedside with surgical team. Pt endorses bowel function (+/+).    OBJECTIVE:    Vital Signs Last 24 Hrs  T(C): 36.2 (08 Aug 2022 01:23), Max: 37.3 (07 Aug 2022 08:50)  T(F): 97.2 (08 Aug 2022 01:23), Max: 99.1 (07 Aug 2022 08:50)  HR: 71 (08 Aug 2022 01:23) (71 - 87)  BP: 112/64 (08 Aug 2022 01:23) (105/66 - 114/78)  BP(mean): --  RR: 18 (08 Aug 2022 01:23) (18 - 18)  SpO2: 99% (08 Aug 2022 01:23) (98% - 99%)    Parameters below as of 08 Aug 2022 01:23  Patient On (Oxygen Delivery Method): room air    I&O's Detail    06 Aug 2022 07:01  -  07 Aug 2022 07:00  --------------------------------------------------------  IN:    Lactated Ringers: 1440 mL  Total IN: 1440 mL    OUT:    Nasogastric/Oral tube (mL): 0 mL    Oral Fluid: 0 mL  Total OUT: 0 mL    Total NET: 1440 mL      07 Aug 2022 07:01  -  08 Aug 2022 03:05  --------------------------------------------------------  IN:    Oral Fluid: 1060 mL  Total IN: 1060 mL    OUT:  Total OUT: 0 mL    Total NET: 1060 mL      MEDICATIONS  (STANDING):  heparin   Injectable 5000 Unit(s) SubCutaneous every 8 hours    MEDICATIONS  (PRN):      PHYSICAL EXAM:  Constitutional: A&Ox3, NAD  Respiratory: Unlabored breathing  Abdomen: NT/ND.  Extremities: WWP, BETANCOURT spontaneously    LABS:                        11.0   7.16  )-----------( 225      ( 07 Aug 2022 07:30 )             34.0     08-07    136  |  101  |  7   ----------------------------<  63<L>  3.7   |  20<L>  |  0.61    Ca    8.6      07 Aug 2022 07:32  Phos  2.9     08-07  Mg     1.7     08-07                IMAGING:

## 2022-08-08 NOTE — DISCHARGE NOTE PROVIDER - HOSPITAL COURSE
30 y/o F w/ no PMH, PSH lap appendectomy presenting to the ED on 8/5 w/ abdominal pain, nausea w/u CTAP c/w SBO w/ transition in the mid pelvis. Pt otherwise hemodynamically stable in the ED w/ normal labs. Pt managed non-operatively w/ NGT placement, NPO, IVF and serial abdominal exams.     8/6 - overnight pt endorsed chest pain that was attributed 2/2 NGT; EKG wnl. Pt stats having an episode of bowel movement and passing gas. Decision for NGT clamp trial; passed, decision to d/c NGT.    8/7 - improving abdominal exam, minimally distended, NT; diet advanced to CLD, tolerating in PM and advanced to LRD.    8/8 - pt endorsing bowel function (+/+), improving abdominal exam (ND/NT), tolerating LRD w/ no acute events overnight. Recovering well on the floors, and amenable to potential d/c home today.

## 2022-08-08 NOTE — PROGRESS NOTE ADULT - ASSESSMENT
31F no PMH, PSH laparoscopic appendectomy 15 years ago, p/w abdominal pain, nausea, found to have SBO with TP in mid pelvis.     PLAN:  - LRD  - IV acetaminophen, exam before pain meds   - Monitor GI function    [facility]  [id number]  31F no PMH, PSH laparoscopic appendectomy 15 years ago, p/w abdominal pain, nausea, found to have SBO with TP in mid pelvis.     PLAN:  - Tolerating LRD  - IV acetaminophen, exam before pain meds   - Monitor GI function (+/+)  - Plan for tentative d/c today    [facility] Surgery  [id number]

## 2022-08-08 NOTE — PROGRESS NOTE ADULT - ATTENDING COMMENTS
date of service 8/7/22    pt seen and examined  pt chart and imaging reviewed in detail  agree with note above  31F with SBO ? adhesive from previous appendectomy, resolving   pt resting in bed NAD  + flatus, + BM  tolerating CLD  soft, min distended, NT no r/g   adv to LRD   cont ivf  strict IOs  serial abd exams  f/u labs in am  dc planning for am if tolerates diet  will cont attempt nonoperative rx for now, if clinically fails to improve or worsens low thresh hold for rpt imaging and possible exploration  d/w pt & family  @ bedside in detail and agrees with plan above.
date of service 8/6/22    pt seen and examined  pt chart and imaging reviewed in detail  agree with note above  31F with SBO ? adhesive from previous appendectomy  pt resting in bed NAD  + flatus, BM  soft, min distended, NT no r/g   min ngt outpt  ngt clamp trial  cont npo, ivf  strict IOs  serial abd exams  f/u labs in am  await return of GI fxn  will attempt nonoperative rx for now, if clinically fails to improve or worsens low thresh hold for rpt imaging and possible exploration  d/w pt @ bedside in detail and agrees with plan above.
date of service 8/8/22    pt seen and examined  pt chart and imaging reviewed in detail  agree with note above  31F with SBO ? adhesive from previous appendectomy, resolving   pt resting in bed NAD  + flatus, + BM  tolerating LRD  soft, ND, NT no r/g   strict IOs  serial abd exams  f/u labs in am  dc home  d/w pt & family  @ bedside in detail and agrees with plan above.

## 2022-08-08 NOTE — DISCHARGE NOTE PROVIDER - CARE PROVIDER_API CALL
Kevin Malave)  Surgery  [address], [address]  [place] [address]  Phone: [phone]  Fax: [phone]  Follow Up Time: 2 weeks

## 2022-08-08 NOTE — DISCHARGE NOTE NURSING/CASE MANAGEMENT/SOCIAL WORK - PATIENT PORTAL LINK FT
You can access the FollowMyHealth Patient Portal offered by [facility] by registering at the following website: [email or web address]. By joining [facility]’s FollowMyHealth portal, you will also be able to view your health information using other applications (apps) compatible with our system.

## 2022-08-08 NOTE — DISCHARGE NOTE PROVIDER - NSDCCPCAREPLAN_GEN_ALL_CORE_FT
PRINCIPAL DISCHARGE DIAGNOSIS  Diagnosis: SBO (small bowel obstruction)  Assessment and Plan of Treatment:        PRINCIPAL DISCHARGE DIAGNOSIS  Diagnosis: SBO (small bowel obstruction)  Assessment and Plan of Treatment: diet as tolerated   Please follow up with your regular medical doctor in 1 week, please call to schedule an appointment to discuss recent hospitalization  Please follow up with Dr. Malave in 2 weeks please call to schedule an appointment  notify Dr. Malave if develop fever, chills, worsening abdominal pain, nausea, vomiting

## 2024-05-03 NOTE — H&P ADULT - BIRTH SEX
How Severe Are Your Spot(S)?: mild
What Type Of Note Output Would You Prefer (Optional)?: Standard Output
What Is The Reason For Today's Visit?: Full Body Skin Examination with No Concerns
What Is The Reason For Today's Visit? (Being Monitored For X): the re-examination of lesions previously examined
Additional History: She does mentions needing a refill of her Tretinoin and Spironolactone.
Female